# Patient Record
Sex: MALE | Race: WHITE | Employment: FULL TIME | URBAN - METROPOLITAN AREA
[De-identification: names, ages, dates, MRNs, and addresses within clinical notes are randomized per-mention and may not be internally consistent; named-entity substitution may affect disease eponyms.]

---

## 2023-03-03 ENCOUNTER — HOSPITAL ENCOUNTER (EMERGENCY)
Age: 18
Discharge: HOME | End: 2023-03-03
Payer: COMMERCIAL

## 2023-03-03 ENCOUNTER — APPOINTMENT (OUTPATIENT)
Dept: GENERAL RADIOLOGY | Age: 18
End: 2023-03-03
Payer: COMMERCIAL

## 2023-03-03 VITALS
RESPIRATION RATE: 14 BRPM | HEART RATE: 75 BPM | SYSTOLIC BLOOD PRESSURE: 132 MMHG | TEMPERATURE: 98.4 F | OXYGEN SATURATION: 95 % | DIASTOLIC BLOOD PRESSURE: 62 MMHG

## 2023-03-03 DIAGNOSIS — S93.401A SPRAIN OF RIGHT ANKLE, UNSPECIFIED LIGAMENT, INITIAL ENCOUNTER: Primary | ICD-10-CM

## 2023-03-03 PROCEDURE — 99283 EMERGENCY DEPT VISIT LOW MDM: CPT

## 2023-03-03 PROCEDURE — 73610 X-RAY EXAM OF ANKLE: CPT

## 2023-03-03 RX ORDER — NAPROXEN 500 MG/1
500 TABLET ORAL 2 TIMES DAILY
Qty: 20 TABLET | Refills: 0 | Status: SHIPPED | OUTPATIENT
Start: 2023-03-03 | End: 2023-03-13

## 2023-03-03 RX ORDER — OLANZAPINE 10 MG/1
10 TABLET ORAL 2 TIMES DAILY
COMMUNITY
Start: 2022-09-27

## 2023-03-03 RX ORDER — METHYLPHENIDATE HYDROCHLORIDE 10 MG/1
10 TABLET ORAL
COMMUNITY
Start: 2022-09-27

## 2023-03-03 ASSESSMENT — ENCOUNTER SYMPTOMS: COLOR CHANGE: 0

## 2023-03-03 NOTE — ED PROVIDER NOTES
[address] [facility]        Pt Name: Johanne Haley  MRN: [id number]  [place] 2005  Date of evaluation: 3/3/2023  Provider: Buzz Burgos PA-C  PCP: Rio Eisenberg  Note Started: 10:33 AM EST 3/3/23      CARMEN. I have evaluated this patient. My supervising physician was available for consultation. CHIEF COMPLAINT       Chief Complaint   Patient presents with    Ankle Pain     Pt to ED with CC of ankle injury x1 week and re-injury yesterday. HISTORY OF PRESENT ILLNESS: 1 or more Elements     History from : Patient and mother    Limitations to history : Language ASL    Justo Westfall is a 21 y.o. male who presents to the emergency department with mother at bedside. Both patient and mother are hard of hearing, however mother reads lips and is able to help interpret with the patient. I am also able to sign with the patient. Mother states that patient twisted his right ankle when he tripped down the stairs 1 week ago. He reinjured it today while at [place] [id number] [facility] [employer]. Teacher advised mother that he should get x-ray of the right ankle because it is swollen. He is able to bear weight but does have increased pain with this. Nursing Notes were all reviewed and agreed with or any disagreements were addressed in the HPI. REVIEW OF SYSTEMS :      Review of Systems   Constitutional:  Negative for chills and fever. HENT: Negative. Musculoskeletal:  Positive for myalgias. Skin:  Negative for color change, pallor, rash and wound. Neurological:  Negative for dizziness, tremors, seizures, syncope, facial asymmetry, speech difficulty, weakness, light-headedness, numbness and headaches. All other systems reviewed and are negative. Positives and Pertinent negatives as per HPI. SURGICAL HISTORY   History reviewed. No pertinent surgical history.     CURRENTMEDICATIONS       Previous Medications    METHYLPHENIDATE (RITALIN) 10 MG TABLET    Take 10 mg by mouth. OLANZAPINE (ZYPREXA) 10 MG TABLET    Take 10 mg by mouth 2 times daily       ALLERGIES     Patient has no known allergies. FAMILYHISTORY     History reviewed. No pertinent family history. SOCIAL HISTORY          SCREENINGS                         CIWA Assessment  BP: 132/62  Heart Rate: 75           PHYSICAL EXAM  1 or more Elements     ED Triage Vitals [03/03/23 1027]   BP Temp Temp Source Heart Rate Resp SpO2 Height Weight   132/62 98.4 °F (36.9 °C) Oral 75 14 95 % -- --       Physical Exam  Vitals and nursing note reviewed. Constitutional:       Appearance: Normal appearance. He is well-developed. He is not toxic-appearing or diaphoretic. HENT:      Head: Normocephalic and atraumatic. Right Ear: External ear normal.      Left Ear: External ear normal.      Nose: Nose normal.   Eyes:      General:         Right eye: No discharge. Left eye: No discharge. Cardiovascular:      Rate and Rhythm: Normal rate. Pulses:           Dorsalis pedis pulses are 2+ on the right side and 2+ on the left side. Posterior tibial pulses are 2+ on the right side and 2+ on the left side. Pulmonary:      Effort: Pulmonary effort is normal.      Breath sounds: Normal breath sounds. Musculoskeletal:         General: Normal range of motion. Cervical back: Normal range of motion. Right hip: Normal.      Left hip: Normal.      Right upper leg: Normal.      Left upper leg: Normal.      Right knee: Normal.      Left knee: Normal.      Right lower leg: Normal.      Left lower leg: Normal.      Right ankle: Swelling present. No deformity, ecchymosis or lacerations. Tenderness present over the lateral malleolus and ATF ligament. No medial malleolus, CF ligament, posterior TF ligament, base of 5th metatarsal or proximal fibula tenderness. Normal range of motion. Anterior drawer test negative. Normal pulse.       Right Achilles Tendon: Normal. Left ankle: Normal.      Left Achilles Tendon: Normal.      Right foot: Normal.      Left foot: Normal.   Skin:     General: Skin is warm and dry. Coloration: Skin is not jaundiced or pale. Findings: No bruising, erythema, lesion or rash. Neurological:      Mental Status: He is alert and oriented to person, place, and time. Sensory: No sensory deficit. Motor: No weakness. Deep Tendon Reflexes: Reflexes normal.   Psychiatric:         Behavior: Behavior normal.           DIAGNOSTIC RESULTS   LABS:    Labs Reviewed - No data to display    When ordered only abnormal lab results are displayed. All other labs were within normal range or not returned as of this dictation. EKG: When ordered, EKG's are interpreted by the Emergency Department Physician in the absence of a cardiologist.  Please see their note for interpretation of EKG. RADIOLOGY:   Non-plain film images such as CT, Ultrasound and MRI are read by the radiologist. Plain radiographic images are visualized and preliminarily interpreted by the ED Provider with the below findings:        Interpretation per the Radiologist below, if available at the time of this note:    XR ANKLE RIGHT (MIN 3 VIEWS)   Final Result   1. No acute abnormality. PROCEDURES   Unless otherwise noted below, none     Procedures    CRITICAL CARE TIME (.cctime)   N/A    PAST MEDICAL HISTORY         Chronic Conditions affecting Care:  has a past medical history of Cochlear implant in place and History of cochlear implant. EMERGENCY DEPARTMENT COURSE and DIFFERENTIAL DIAGNOSIS/MDM:   Vitals:    Vitals:    03/03/23 1027   BP: 132/62   Pulse: 75   Resp: 14   Temp: 98.4 °F (36.9 °C)   TempSrc: Oral   SpO2: 95%       Patient was given the following medications:  Medications - No data to display          Is this patient to be included in the SEP-1 Core Measure due to severe sepsis or septic shock?    No   Exclusion criteria - the patient is NOT to be included for SEP-1 Core Measure due to: Infection is not suspected    CONSULTS: (Who and What was discussed)  None  Discussion with Other Profesionals : None    Social Determinants : hard of hearing    Records Reviewed : None    CC/HPI Summary, DDx, ED Course, and Reassessment: This patient presents to the emergency department with right ankle pain status post injury last week and again today. Motor and sensory function are preserved. X-ray shows no acute osseous abnormality. Differentials include but not limited to strain, sprain, arthritis, bursitis, tendinitis, contusion, spasm. Ankle brace and crutches provided. Continue cryotherapy and elevation. Patient will be sent home with medicine as needed for pain. Advised to follow-up with PCP for recheck and may return to ED per discharge instructions. My suspicion is low for subungual hematoma, paronychia, eponychia, felon, flexor tenosynovitis, foreign body, tendon rupture, compartment syndrome, acute fracture, dislocation, DVT, arterial compromise or occlusion, limb ischemia, gout, septic joint, abscess, cellulitis, osteomyelitis, gonococcal arthritis, SCFE, avascular necrosis, Osgood-Schlatter syndrome, or other concerning pathology. Disposition Considerations (include 1 Tests not done, Shared Decision Making, Pt Expectation of Test or Tx.): xrays are appropriate imaging for this acute injury  Appropriate for outpatient management        I am the Primary Clinician of Record. FINAL IMPRESSION      1.  Sprain of right ankle, unspecified ligament, initial encounter          DISPOSITION/PLAN     DISPOSITION Decision To Discharge 03/03/2023 11:07:48 AM      PATIENT REFERRED TO:  [facility] Emergency Department  [address]  [address]    If symptoms worsen    [facility]) Pre-Services  [phone]        DISCHARGE MEDICATIONS:  New Prescriptions    NAPROXEN (NAPROSYN) 500 MG TABLET    Take 1 tablet by mouth 2 times daily for 20 doses       DISCONTINUED MEDICATIONS:  Discontinued Medications    No medications on file              (Please note that portions of this note were completed with a voice recognition program.  Efforts were made to edit the dictations but occasionally words are mis-transcribed.)    Jose Zuniga PA-C (electronically signed)            Jose Zuniga PA-C  03/03/23 [id number]

## 2024-03-24 ENCOUNTER — HOSPITAL ENCOUNTER (EMERGENCY)
Age: 19
Discharge: HOME | End: 2024-03-24
Attending: EMERGENCY MEDICINE
Payer: COMMERCIAL

## 2024-03-24 VITALS
TEMPERATURE: 98.6 F | OXYGEN SATURATION: 97 % | DIASTOLIC BLOOD PRESSURE: 77 MMHG | SYSTOLIC BLOOD PRESSURE: 125 MMHG | HEART RATE: 65 BPM | WEIGHT: 136 LBS | RESPIRATION RATE: 18 BRPM

## 2024-03-24 DIAGNOSIS — T14.8XXA AVULSION OF SKIN: Primary | ICD-10-CM

## 2024-03-24 PROCEDURE — 6370000000 HC RX 637 (ALT 250 FOR IP): Performed by: PHYSICIAN ASSISTANT

## 2024-03-24 PROCEDURE — 99283 EMERGENCY DEPT VISIT LOW MDM: CPT

## 2024-03-24 RX ORDER — CEPHALEXIN 250 MG/1
500 CAPSULE ORAL ONCE
Status: COMPLETED | OUTPATIENT
Start: 2024-03-24 | End: 2024-03-24

## 2024-03-24 RX ADMIN — CEPHALEXIN 500 MG: 250 CAPSULE ORAL at 10:47

## 2024-03-24 ASSESSMENT — PAIN DESCRIPTION - ORIENTATION: ORIENTATION: LEFT

## 2024-03-24 ASSESSMENT — PAIN SCALES - GENERAL: PAINLEVEL_OUTOF10: 1

## 2024-03-24 ASSESSMENT — PAIN DESCRIPTION - LOCATION: LOCATION: HAND

## 2024-03-24 ASSESSMENT — PAIN - FUNCTIONAL ASSESSMENT: PAIN_FUNCTIONAL_ASSESSMENT: 0-10

## 2024-03-24 NOTE — ED PROVIDER NOTES
I have personally performed a face to face diagnostic evaluation on this patient. I have fully participated in the care of this patient I personally saw the patient and performed a substantive portion of the visit including all aspects of the medical decision making.  I have reviewed and agree with all pertinent clinical information including history, physical exam, diagnostic tests, and the plan.      HPI: Justo Ogden presented with laceration, abrasion to left hand fifth digit from broken mug while doing the dishes.  Patient is hearing impaired,  was used.  Patient states that bleeding is controlled with pressure.  Cleaned the wound thoroughly.  Believes his tetanus up-to-date but he is not sure.  No numbness or weakness no other associated symptoms.  Chief Complaint   Patient presents with    Laceration     Pt with laceration to left hand 5th digit from broken mug while doing the dishes. Bleeding controlled.       Review of Systems: See CARMEN note  Vital Signs: /77   Pulse 65   Temp 98.6 °F (37 °C) (Oral)   Resp 18   Wt 61.7 kg (136 lb)   SpO2 97%     Alert 18 y.o. male who does not appear toxic or acutely ill  HENT: Atraumatic  Neck: Grossly normal ROM  Chest/Lungs: respiratory effort normal   Extremities: 2+ radial bilateral  Musculoskeletal: Grossly normal ROM, normal strength and range of motion of fingers in bilateral hands with abrasion 1/2 cm seen to the medial aspect of the fifth digit of left hand.  Patient removed superficial layer of skin  Skin: No palor or diaphoresis    Medical Decision Making and Plan:  Pertinent Labs & Imaging studies reviewed. (See CARMEN chart for details)  I agree with CARMEN assessment and plan.  18-year-old male presents for superficial laceration to the left fifth digit.  Significant layer of skin removal.  Not a straight laceration.  Not necessarily able to be closed by sutures at this time.  Will allow to heal by secondary intention.  Will place patient in

## 2024-03-24 NOTE — ED PROVIDER NOTES
[facility] [place] EMERGENCY DEPARTMENT  EMERGENCY DEPARTMENT ENCOUNTER        Pt Name: Justo Ogden  MRN: [id number]  Birthdate 2005  Date of evaluation: 3/24/2024  Provider: Laith Young PA-C  PCP: Rodney Nielsen  Note Started: 10:24 AM EDT 3/24/24       I have seen and evaluated this patient with my supervising physician No att. providers found.      CHIEF COMPLAINT       Chief Complaint   Patient presents with    Laceration     Pt with laceration to left hand 5th digit from broken mug while doing the dishes. Bleeding controlled.        HISTORY OF PRESENT ILLNESS: 1 or more Elements     History From: patient  Limitations to history : Language     Justo Ogden is a 18 y.o. male who presents with a laceration to his left fifth digit from just prior to arrival.  Patient was doing dishes when a ceramic mug broke and lacerated the side of his fifth digit.  Patient is up-to-date on vaccines per mother at bedside.  Denies any other injuries, weakness, paresthesia, decreased range of motion.  Bleeding controlled with direct pressure.    Nursing Notes were all reviewed and agreed with or any disagreements were addressed in the HPI.    REVIEW OF SYSTEMS :      Review of Systems   Constitutional: Negative.    Musculoskeletal: Negative.    Skin: Negative.    Neurological: Negative.    All other systems reviewed and are negative.      Positives and Pertinent negatives as per HPI.       PAST MEDICAL HISTORY    has a past medical history of Cochlear implant in place and History of cochlear implant.     SURGICAL HISTORY   History reviewed. No pertinent surgical history.    CURRENTMEDICATIONS       Discharge Medication List as of 3/24/2024 10:44 AM        CONTINUE these medications which have NOT CHANGED    Details   LITHIUM PO Take by mouthHistorical Med      Methylphenidate HCl (CONCERTA PO) Take by mouthHistorical Med      OLANZapine (ZYPREXA) 10 MG tablet Take 1 tablet by